# Patient Record
Sex: MALE | Race: BLACK OR AFRICAN AMERICAN | Employment: FULL TIME | URBAN - METROPOLITAN AREA
[De-identification: names, ages, dates, MRNs, and addresses within clinical notes are randomized per-mention and may not be internally consistent; named-entity substitution may affect disease eponyms.]

---

## 2017-09-18 ENCOUNTER — APPOINTMENT (OUTPATIENT)
Dept: GENERAL RADIOLOGY | Age: 32
End: 2017-09-18
Attending: EMERGENCY MEDICINE
Payer: SELF-PAY

## 2017-09-18 ENCOUNTER — HOSPITAL ENCOUNTER (EMERGENCY)
Age: 32
Discharge: HOME | End: 2017-09-18
Attending: EMERGENCY MEDICINE
Payer: SELF-PAY

## 2017-09-18 VITALS
OXYGEN SATURATION: 97 % | SYSTOLIC BLOOD PRESSURE: 148 MMHG | DIASTOLIC BLOOD PRESSURE: 82 MMHG | HEART RATE: 73 BPM | TEMPERATURE: 98.6 F | BODY MASS INDEX: 36.7 KG/M2 | WEIGHT: 301.37 LBS | HEIGHT: 76 IN | RESPIRATION RATE: 18 BRPM

## 2017-09-18 DIAGNOSIS — M25.511 PAIN IN JOINT OF RIGHT SHOULDER: Primary | ICD-10-CM

## 2017-09-18 PROCEDURE — 74011250637 HC RX REV CODE- 250/637: Performed by: PHYSICIAN ASSISTANT

## 2017-09-18 PROCEDURE — 73030 X-RAY EXAM OF SHOULDER: CPT

## 2017-09-18 PROCEDURE — 99283 EMERGENCY DEPT VISIT LOW MDM: CPT

## 2017-09-18 RX ORDER — IBUPROFEN 400 MG/1
800 TABLET ORAL
Status: COMPLETED | OUTPATIENT
Start: 2017-09-18 | End: 2017-09-18

## 2017-09-18 RX ORDER — METHOCARBAMOL 500 MG/1
500 TABLET, FILM COATED ORAL
Qty: 20 TAB | Refills: 0 | OUTPATIENT
Start: 2017-09-18 | End: 2021-07-21

## 2017-09-18 RX ORDER — IBUPROFEN 800 MG/1
800 TABLET ORAL
Qty: 20 TAB | Refills: 0 | Status: SHIPPED | OUTPATIENT
Start: 2017-09-18 | End: 2017-09-25

## 2017-09-18 RX ORDER — ACETAMINOPHEN 325 MG/1
650 TABLET ORAL
Qty: 20 TAB | Refills: 0 | OUTPATIENT
Start: 2017-09-18 | End: 2021-07-21

## 2017-09-18 RX ADMIN — IBUPROFEN 800 MG: 400 TABLET, FILM COATED ORAL at 14:31

## 2017-09-18 NOTE — DISCHARGE INSTRUCTIONS
Shoulder Bursitis: Care Instructions  Your Care Instructions    Bursitis is inflammation of the bursa. A bursa is a small sac of fluid that cushions a joint and helps it move easily. A bursa sits under the highest point of your shoulder. You can get bursitis by overusing your shoulder, which can happen with activities such as lifting, pitching a ball, or painting. Symptoms of bursitis include pain when you move your arm. Your arm may hurt when you try to lift it, and the pain can reach down the side of your arm. You may have trouble sleeping because of the pain. Bursitis usually gets better if you avoid the activity that caused it. If pain lasts or gets worse despite home treatment, your doctor may draw fluid from the bursa through a needle. This may relieve your pain and help your doctor know if you have an infection. If so, your doctor will prescribe antibiotics. If you have inflammation only, you may get a corticosteroid shot to reduce swelling and pain. Sometimes surgery is needed to drain or remove the bursa. Follow-up care is a key part of your treatment and safety. Be sure to make and go to all appointments, and call your doctor if you are having problems. Its also a good idea to know your test results and keep a list of the medicines you take. How can you care for yourself at home? · Put ice or a cold pack on your shoulder for 10 to 20 minutes at a time. Put a thin cloth between the ice and your skin. · After 3 days of using ice, use heat on your shoulder. You can use a hot water bottle, a heating pad set on low, or a warm, moist towel. Some doctors suggest alternating between hot and cold. · Rest your shoulder. Stop any activities that cause pain. Switch to activities that do not stress your shoulder. · Take your medicines exactly as prescribed. Call your doctor if you think you are having a problem with your medicine.   · If your doctor recommends it, take anti-inflammatory medicines to reduce pain. These include ibuprofen (Advil, Motrin) and naproxen (Aleve). Read and follow all instructions on the label. · To prevent stiffness, gently move the shoulder joint through its full range of motion. As the pain gets better, keep doing range-of-motion exercises. Ask your doctor for exercises that will make the muscles around the shoulder joint stronger. Do these as directed. · You can slowly return to the activity that caused the pain, but do it with less effort until you can do it without pain or swelling. Be sure to warm up before and stretch after you do the activity. When should you call for help? Call your doctor now or seek immediate medical care if:  · You have a fever. · You have increased swelling or redness in your shoulder. · You cannot use your shoulder, or the pain in your shoulder gets worse. Watch closely for changes in your health, and be sure to contact your doctor if:  · You have pain for 2 weeks or longer despite home treatment. Where can you learn more? Go to http://monroe-alannah.info/. Enter M955 in the search box to learn more about \"Shoulder Bursitis: Care Instructions. \"  Current as of: March 21, 2017  Content Version: 11.3  © 8157-6840 [employer]. Care instructions adapted under license by [employer] (which disclaims liability or warranty for this information). If you have questions about a medical condition or this instruction, always ask your healthcare professional. [place] [address] any warranty or liability for your use of this information.

## 2017-09-18 NOTE — ED PROVIDER NOTES
Patient is a 27 y.o. male presenting with shoulder pain. The history is provided by the patient. Shoulder Pain    The incident occurred 2 days ago (Pt endorses waking up 3 mornings ago with Rt shoulder pain. NKI. ). There was no injury mechanism. The right shoulder is affected. The pain is at a severity of 8/10. The pain is moderate. The pain has been constant since onset. The pain does not radiate. There is no history of shoulder injury. He has no other injuries. There is no history of shoulder surgery. Pertinent negatives include no numbness, no muscle weakness and no tingling. He reports no foreign bodies present. Past Medical History:   Diagnosis Date    Asthma        History reviewed. No pertinent surgical history. History reviewed. No pertinent family history. Social History     Social History    Marital status: SINGLE     Spouse name: N/A    Number of children: N/A    Years of education: N/A     Occupational History    Not on file. Social History Main Topics    Smoking status: Current Some Day Smoker     Packs/day: 0.25    Smokeless tobacco: Not on file    Alcohol use No    Drug use: No      Comment: last use 1/25/15    Sexual activity: Not on file     Other Topics Concern    Not on file     Social History Narrative         ALLERGIES: Review of patient's allergies indicates no known allergies. Review of Systems   Constitutional: Negative for activity change, appetite change, chills, fatigue and fever. HENT: Negative. Respiratory: Negative. Negative for cough and shortness of breath. Cardiovascular: Negative. Negative for chest pain. Gastrointestinal: Negative. Negative for abdominal pain. Musculoskeletal: Positive for arthralgias. Negative for back pain, gait problem, joint swelling, myalgias, neck pain and neck stiffness. Skin: Negative. Negative for color change, pallor, rash and wound. Neurological: Negative for tingling, numbness and headaches. Psychiatric/Behavioral: Negative. Vitals:    09/18/17 1350   BP: 148/82   Pulse: 73   Resp: 18   Temp: 98.6 °F (37 °C)   SpO2: 97%   Weight: 136.7 kg (301 lb 5.9 oz)   Height: 6' 4\" (1.93 m)            Physical Exam   Constitutional: He is oriented to person, place, and time. He appears well-developed and well-nourished. No distress. HENT:   Head: Normocephalic and atraumatic. Right Ear: Hearing and external ear normal.   Left Ear: Hearing and external ear normal.   Nose: Nose normal.   Eyes: Conjunctivae and EOM are normal. Pupils are equal, round, and reactive to light. Neck: Normal range of motion. Cardiovascular: Normal rate, regular rhythm, normal heart sounds and intact distal pulses. Pulmonary/Chest: Effort normal and breath sounds normal. No accessory muscle usage. No respiratory distress. He has no wheezes. He has no rales. He exhibits no tenderness. Abdominal: Soft. There is no tenderness. Musculoskeletal:        Right shoulder: He exhibits decreased range of motion (d/t pain.), tenderness, bony tenderness and pain. He exhibits no swelling, no effusion, no crepitus, no deformity, no laceration, no spasm and normal pulse. Decreased strength: d/t pain. Left shoulder: Normal.        Right elbow: Normal.       Cervical back: Normal.        Thoracic back: Normal.        Lumbar back: Normal.   Neurological: He is alert and oriented to person, place, and time. Skin: Skin is warm, dry and intact. No abrasion, no bruising, no burn, no ecchymosis and no rash noted. He is not diaphoretic. No erythema. No pallor. Psychiatric: He has a normal mood and affect. His speech is normal and behavior is normal. Judgment and thought content normal.   Nursing note and vitals reviewed.        MDM  Number of Diagnoses or Management Options  Pain in joint of right shoulder:   Diagnosis management comments: DDx: bursitis, arthritis, strain, sprain, fx, dislocation unlikely    LABORATORY TESTS:  No results found for this or any previous visit (from the past 12 hour(s)). IMAGING RESULTS:  XR SHOULDER RT AP/LAT MIN 2 V   Final Result   EXAM:  XR SHOULDER RT AP/LAT MIN 2 V     INDICATION:   Right shoulder pain after fall last night.     COMPARISON: None.     FINDINGS: 2 views of the right shoulder demonstrate no fracture, dislocation or  other acute abnormality.     IMPRESSION  IMPRESSION:  No acute abnormality. MEDICATIONS GIVEN:  Medications  ibuprofen (MOTRIN) tablet 800 mg (not administered)    IMPRESSION:  Pain in joint of right shoulder  (primary encounter diagnosis)    PLAN:  1. Current Discharge Medication List    START taking these medications    ibuprofen (MOTRIN) 800 mg tablet  Take 1 Tab by mouth every six (6) hours as needed for Pain for up to 7 days. Qty: 20 Tab Refills: 0    acetaminophen (TYLENOL) 325 mg tablet  Take 2 Tabs by mouth every four (4) hours as needed for Pain. Qty: 20 Tab Refills: 0    methocarbamol (ROBAXIN) 500 mg tablet  Take 1 Tab by mouth every six (6) hours as needed (mm spasm). Qty: 20 Tab Refills: 0      CONTINUE these medications which have NOT CHANGED    albuterol (PROVENTIL, VENTOLIN) 90 mcg/Actuation inhaler  Take 2 Puffs by inhalation every six (6) hours as needed.         2. Follow-up Information     Follow up With Details Comments Contact Info    [facility] Schedule an appointment as soon as possible for a visit in   1 week As needed, If symptoms worsen [facility]  [address] [address] [address] Schedule an appointment as soon as possible for a   visit in 1 week As needed, If symptoms worsen [address] [facility] [address]  [phone]      Return to ED if worse                  Amount and/or Complexity of Data Reviewed  Tests in the radiology section of CPT®: ordered and reviewed  Tests in the medicine section of CPT®: ordered and reviewed    Patient Progress  Patient progress: stable    ED Course       Procedures    2:30 PM  I have discussed with patient their diagnosis, treatment, and follow up plan. The patient agrees to follow up as outlined in discharge paperwork and also to return to the ED with any worsening.  Mavis Andrews PA-C

## 2021-07-21 ENCOUNTER — HOSPITAL ENCOUNTER (EMERGENCY)
Age: 36
Discharge: HOME | End: 2021-07-21
Attending: EMERGENCY MEDICINE

## 2021-07-21 VITALS
TEMPERATURE: 99.1 F | OXYGEN SATURATION: 97 % | DIASTOLIC BLOOD PRESSURE: 98 MMHG | BODY MASS INDEX: 38.36 KG/M2 | HEIGHT: 76 IN | RESPIRATION RATE: 16 BRPM | SYSTOLIC BLOOD PRESSURE: 144 MMHG | HEART RATE: 90 BPM | WEIGHT: 315 LBS

## 2021-07-21 DIAGNOSIS — L02.01 FACIAL ABSCESS: Primary | ICD-10-CM

## 2021-07-21 DIAGNOSIS — L03.211 CELLULITIS OF FACE: ICD-10-CM

## 2021-07-21 PROCEDURE — 99283 EMERGENCY DEPT VISIT LOW MDM: CPT

## 2021-07-21 PROCEDURE — 74011250637 HC RX REV CODE- 250/637: Performed by: PHYSICIAN ASSISTANT

## 2021-07-21 RX ORDER — IBUPROFEN 400 MG/1
800 TABLET ORAL
Status: COMPLETED | OUTPATIENT
Start: 2021-07-21 | End: 2021-07-21

## 2021-07-21 RX ORDER — SULFAMETHOXAZOLE AND TRIMETHOPRIM 800; 160 MG/1; MG/1
1 TABLET ORAL 2 TIMES DAILY
Qty: 14 TABLET | Refills: 0 | Status: SHIPPED | OUTPATIENT
Start: 2021-07-21 | End: 2021-07-28

## 2021-07-21 RX ORDER — IBUPROFEN 800 MG/1
800 TABLET ORAL
Qty: 20 TABLET | Refills: 0 | Status: SHIPPED | OUTPATIENT
Start: 2021-07-21 | End: 2021-07-28

## 2021-07-21 RX ORDER — SULFAMETHOXAZOLE AND TRIMETHOPRIM 800; 160 MG/1; MG/1
1 TABLET ORAL
Status: COMPLETED | OUTPATIENT
Start: 2021-07-21 | End: 2021-07-21

## 2021-07-21 RX ADMIN — IBUPROFEN 800 MG: 400 TABLET, FILM COATED ORAL at 23:10

## 2021-07-21 RX ADMIN — SULFAMETHOXAZOLE AND TRIMETHOPRIM 1 TABLET: 800; 160 TABLET ORAL at 23:10

## 2021-07-22 NOTE — ED PROVIDER NOTES
EMERGENCY DEPARTMENT HISTORY AND PHYSICAL EXAM    Date: 7/21/2021  Patient Name: Grace Thomason    History of Presenting Illness     Chief Complaint   Patient presents with    Facial Swelling         History Provided By: Patient    HPI: Grace Thomason is a 43 y.o. male with a PMH of asthma who presents with L facial swelling x 2 days. Pt states he noticed some swelling to the L cheek/beard area yesterday but when he got out of shower today he tried to squeeze the area. Pt states some pus came out and then his face started swelling more and is now painful. Pt states he tried tylenol and benadryl with no relief. Pt rates pain 10/10. Pain is exacerbated with touch and there are no alleviating factors reported. PCP: None    Current Outpatient Medications   Medication Sig Dispense Refill    trimethoprim-sulfamethoxazole (Bactrim DS) 160-800 mg per tablet Take 1 Tablet by mouth two (2) times a day for 7 days. 14 Tablet 0    ibuprofen (MOTRIN) 800 mg tablet Take 1 Tablet by mouth every eight (8) hours as needed for Pain for up to 7 days. 20 Tablet 0    albuterol (PROVENTIL, VENTOLIN) 90 mcg/Actuation inhaler Take 2 Puffs by inhalation every six (6) hours as needed. Past History     Past Medical History:  Past Medical History:   Diagnosis Date    Asthma        Past Surgical History:  No past surgical history on file. Family History:  No family history on file. Social History:  Social History     Tobacco Use    Smoking status: Current Some Day Smoker     Packs/day: 0.25   Substance Use Topics    Alcohol use: No    Drug use: No     Types: Marijuana     Comment: last use 1/25/15       Allergies:  No Known Allergies      Review of Systems   Review of Systems   Constitutional: Negative for chills and fever. HENT: Positive for facial swelling. Skin: Positive for color change and wound. Allergic/Immunologic: Negative for immunocompromised state.    Neurological: Negative for speech difficulty and weakness. All other systems reviewed and are negative. Physical Exam     Vitals:    07/21/21 2153   BP: (!) 144/98   Pulse: 90   Resp: 16   Temp: 99.1 °F (37.3 °C)   SpO2: 97%   Weight: 149.7 kg (330 lb)   Height: 6' 4\" (1.93 m)     Physical Exam  Vitals and nursing note reviewed. Constitutional:       General: He is not in acute distress. Appearance: He is well-developed. HENT:      Head: Normocephalic and atraumatic. Mouth/Throat:      Pharynx: No oropharyngeal exudate. Eyes:      Conjunctiva/sclera: Conjunctivae normal.   Cardiovascular:      Rate and Rhythm: Normal rate and regular rhythm. Heart sounds: Normal heart sounds. Pulmonary:      Effort: Pulmonary effort is normal. No respiratory distress. Breath sounds: Normal breath sounds. No wheezing or rales. Musculoskeletal:         General: Normal range of motion. Skin:     General: Skin is warm and dry. Neurological:      Mental Status: He is alert and oriented to person, place, and time. Diagnostic Study Results     Labs -   No results found for this or any previous visit (from the past 12 hour(s)). Radiologic Studies -   No orders to display     CT Results  (Last 48 hours)    None        CXR Results  (Last 48 hours)    None            Medical Decision Making   I am the first provider for this patient. I reviewed the vital signs, available nursing notes, past medical history, past surgical history, family history and social history. Vital Signs-Reviewed the patient's vital signs. Records Reviewed: Nursing Notes and Old Medical Records    Provider Notes (Medical Decision Making):   Patient presents with indurated non fluctuant warm painful lesion concerning for abscess. No signs of sepsis at this time. Do not feel I&D would be beneficial at this time so will start pt on abx and NSAIDs.   Advised to do warm compresses at least TID and return in 48hrs if no improvement or worsening symptoms. Disposition:  Discharged    DISCHARGE NOTE:   11:18 PM      Care plan outlined and precautions discussed. Patient has no new complaints, changes, or physical findings. All medications were reviewed with the patient; will d/c home. All of pt's questions and concerns were addressed. Patient was instructed and agrees to follow up with PCP prn, as well as to return to the ED upon further deterioration. Patient is ready to go home. Follow-up Information     Follow up With Specialties Details Why Contact Info    [facility]  In 1 week As needed [address] [place] [address] [address] [facility] [address]    [address] [facility] EMERGENCY DEPT Emergency Medicine In 2 days If symptoms persist [address]          Discharge Medication List as of 7/21/2021 11:17 PM      START taking these medications    Details   trimethoprim-sulfamethoxazole (Bactrim DS) 160-800 mg per tablet Take 1 Tablet by mouth two (2) times a day for 7 days. , Normal, Disp-14 Tablet, R-0      ibuprofen (MOTRIN) 800 mg tablet Take 1 Tablet by mouth every eight (8) hours as needed for Pain for up to 7 days. , Normal, Disp-20 Tablet, R-0         CONTINUE these medications which have NOT CHANGED    Details   albuterol (PROVENTIL, VENTOLIN) 90 mcg/Actuation inhaler Take 2 Puffs by inhalation every six (6) hours as needed. Historical Med, 2 Puff             Procedures:  Procedures    Please note that this dictation was completed with Dragon, computer voice recognition software. Quite often unanticipated grammatical, syntax, homophones, and other interpretive errors are inadvertently transcribed by the computer software. Please disregard these errors. Additionally, please excuse any errors that have escaped final proofreading. Diagnosis     Clinical Impression:   1. Facial abscess    2.  Cellulitis of face

## 2021-07-22 NOTE — ED TRIAGE NOTES
Patient presents to the ED with c/o a bump on the left side of his face that he squeezed once he got out of the shower. Stated pus came out of it and then his face began to swell. Pt reports severe pain in his face.

## 2021-07-22 NOTE — ED NOTES
Patient is alert and oriented x 4 and in no acute distress at this time. Respirations are at a regular rate, depth, and pattern. Patient updated on plan of care and has no questions or concerns at this time. Call bell within reach. Will continue to monitor. Please reference nursing assessment. Emergency Department Nursing Plan of Care       The Nursing Plan of Care is developed from the Nursing assessment and Emergency Department Attending provider initial evaluation. The plan of care may be reviewed in the ED Provider note.     The Plan of Care was developed with the following considerations:   Patient / Family readiness to learn indicated by:verbalized understanding and successful return demonstration  Persons(s) to be included in education: patient  Barriers to Learning/Limitations:No    Signed     Lissa Chow RN    7/21/2021   10:56 PM

## 2021-08-11 ENCOUNTER — HOSPITAL ENCOUNTER (EMERGENCY)
Age: 36
Discharge: HOME | End: 2021-08-11
Attending: EMERGENCY MEDICINE

## 2021-08-11 VITALS
BODY MASS INDEX: 38.36 KG/M2 | HEART RATE: 85 BPM | TEMPERATURE: 99 F | WEIGHT: 315 LBS | RESPIRATION RATE: 18 BRPM | SYSTOLIC BLOOD PRESSURE: 148 MMHG | OXYGEN SATURATION: 96 % | DIASTOLIC BLOOD PRESSURE: 89 MMHG | HEIGHT: 76 IN

## 2021-08-11 DIAGNOSIS — L73.8 FOLLICULITIS BARBAE: Primary | ICD-10-CM

## 2021-08-11 PROCEDURE — 99282 EMERGENCY DEPT VISIT SF MDM: CPT

## 2021-08-11 PROCEDURE — 75810000218 HC FINE NEEDLE ASPIRATION

## 2021-08-11 RX ORDER — CEPHALEXIN 500 MG/1
500 CAPSULE ORAL 4 TIMES DAILY
Qty: 40 CAPSULE | Refills: 0 | Status: SHIPPED | OUTPATIENT
Start: 2021-08-11 | End: 2021-08-21

## 2021-08-11 RX ORDER — MUPIROCIN 20 MG/G
OINTMENT TOPICAL DAILY
Qty: 22 G | Refills: 0 | Status: SHIPPED | OUTPATIENT
Start: 2021-08-11 | End: 2022-06-08

## 2021-08-11 NOTE — DISCHARGE INSTRUCTIONS
It was a pleasure taking care of you at [facility] Emergency Department today. We know that when you come to [facility], you are entrusting us with your health, comfort, and safety. Our physicians and nurses honor that trust, and we truly appreciate the opportunity to care for you and your loved ones. We also value our feedback. If you receive a survey about your Emergency Department experience today, please fill it out. We care about our patients' feedback, and we listen to what you have to say. Thank you!

## 2021-08-11 NOTE — ED TRIAGE NOTES
Patient presents to the ED with c/o skin problem to left side of face. Pt reports receiving an antibiotic and then the swelling came back.

## 2021-08-11 NOTE — ED NOTES
Pt given printed discharge instructions and 2 script(s). Pt verbalized understanding of instructions and script(s). Pt alert and oriented, in no acute distress, ambulatory with self.

## 2021-08-11 NOTE — ED PROVIDER NOTES
EMERGENCY DEPARTMENT HISTORY AND PHYSICAL EXAM      Date: 8/11/2021  Patient Name: Phyllis Gratn    History of Presenting Illness     Chief Complaint   Patient presents with    Skin Problem     History Provided By: Patient    HPI: Phyllis Grant, 43 y.o. male with medical history significant for asthma and tobacco abuse who presents via self to the ED with cc of acute mild aching left cheek rash X 1 month. Endorses he was treated for cellulitis and abscess of left cheek last month; completed Bactrim antibiotics as prescribed. He endorses that the swelling went down, but has now returned within the last day or 2. No medications or modifying factors. No fever, chills, nausea, vomiting, headache, drainage, neck pain or stiffness. PCP: None    There are no other complaints, changes, or physical findings at this time. No current facility-administered medications on file prior to encounter. Current Outpatient Medications on File Prior to Encounter   Medication Sig Dispense Refill    albuterol (PROVENTIL, VENTOLIN) 90 mcg/Actuation inhaler Take 2 Puffs by inhalation every six (6) hours as needed. Past History     Past Medical History:  Past Medical History:   Diagnosis Date    Asthma      Past Surgical History:  No past surgical history on file. Family History:  No family history on file. Social History:  Social History     Tobacco Use    Smoking status: Current Some Day Smoker     Packs/day: 0.25   Substance Use Topics    Alcohol use: No    Drug use: No     Types: Marijuana     Comment: last use 1/25/15     Allergies:  No Known Allergies  Review of Systems   Review of Systems   Constitutional: Negative. Negative for activity change, appetite change, chills, diaphoresis, fatigue and fever. HENT: Negative. Eyes: Negative. Negative for pain and visual disturbance. Respiratory: Negative. Negative for apnea, cough, chest tightness and shortness of breath. Cardiovascular: Negative. Negative for chest pain, palpitations and leg swelling. Gastrointestinal: Negative. Negative for abdominal pain, diarrhea, nausea and vomiting. Genitourinary: Negative. Musculoskeletal: Negative. Negative for arthralgias, neck pain and neck stiffness. Skin: Positive for rash. Negative for color change, pallor and wound. Neurological: Negative for dizziness, light-headedness and headaches. Psychiatric/Behavioral: Negative. Physical Exam   Physical Exam  Vitals and nursing note reviewed. Constitutional:       General: He is not in acute distress. Appearance: Normal appearance. He is well-developed. He is not ill-appearing, toxic-appearing or diaphoretic. HENT:      Head: Normocephalic and atraumatic. Right Ear: Hearing and external ear normal.      Left Ear: Hearing and external ear normal.      Nose: Nose normal.   Eyes:      Conjunctiva/sclera: Conjunctivae normal.      Pupils: Pupils are equal, round, and reactive to light. Pulmonary:      Effort: Pulmonary effort is normal. No accessory muscle usage or respiratory distress. Musculoskeletal:         General: Normal range of motion. Cervical back: Normal range of motion. Skin:     General: Skin is warm and dry. Coloration: Skin is not pale. Findings: Rash present. No abscess. Rash is pustular. Neurological:      Mental Status: He is alert and oriented to person, place, and time. Psychiatric:         Speech: Speech normal.         Behavior: Behavior normal.         Thought Content: Thought content normal.         Judgment: Judgment normal.       Diagnostic Study Results   Labs -   No results found for this or any previous visit (from the past 12 hour(s)). Radiologic Studies -   No orders to display     No results found. Medical Decision Making   I am the first provider for this patient.     I reviewed the vital signs, available nursing notes, past medical history, past surgical history, family history and social history. Vital Signs-Reviewed the patient's vital signs. Patient Vitals for the past 24 hrs:   Temp Pulse Resp BP SpO2   08/11/21 1722 99 °F (37.2 °C) 85 18 (!) 148/89 96 %     Pulse Oximetry Analysis - 96% on RA (normal)    Records Reviewed: Nursing Notes, Old Medical Records, Previous Radiology Studies and Previous Laboratory Studies    Provider Notes (Medical Decision Making):   26-year-old male presents with recurrent small pustule to left cheek X 1 month. Differential includes folliculitis, abscess, cellulitis, cyst.  Patient is requesting I&D at this time. There is no fluctuant lesion concerning for abscess, but there is a small pustule which may warrant needle aspiration. Will also treat patient with antibiotics. ED Course:   Initial assessment performed. The patients presenting problems have been discussed, and they are in agreement with the care plan formulated and outlined with them. I have encouraged them to ask questions as they arise throughout their visit. Procedure Note - Needle Aspiration:   5:35 PM  Performed by: GINNY Armando  Verbal Consent obtained and witnessed by ANIKET Conner  Complexity: simple    Skin prepped with alcohol prep wipe. Sterile field established. Pustule to left cheek was aspirated with # 18 gauge needle, and 0.25mLs of purulent and bloody drainage was expressed. Sterile dressing applied. Estimated blood loss: minimal  The procedure took 1-15 minutes, and pt tolerated well. Progress Note:   Updated pt on all returned results and findings. Discussed the importance of proper follow up as referred below along with return precautions. Pt in agreement with the care plan and expresses agreement with and understanding of all items discussed. TOBACCO COUNSELING:  Upon evaluation, pt expressed that they are a current tobacco user.  Pt has been counseled on the dangers of smoking and was encouraged to quit as soon as possible in order to decrease further risks to their health. Pt has conveyed their understanding of the risks involved should they continue to use tobacco products. 4-minute discussion. Disposition:  5:35 PM  I have discussed with patient their diagnosis, treatment, and follow up plan. The patient agrees to follow up as outlined in discharge paperwork and also to return to the ED with any worsening. April Alvarenga PA-C      PLAN:  1. Current Discharge Medication List      START taking these medications    Details   mupirocin (BACTROBAN) 2 % ointment Apply  to affected area daily. Qty: 22 g, Refills: 0  Start date: 8/11/2021      cephALEXin (Keflex) 500 mg capsule Take 1 Capsule by mouth four (4) times daily for 10 days. Qty: 40 Capsule, Refills: 0  Start date: 8/11/2021, End date: 8/21/2021           2. Follow-up Information     Follow up With Specialties Details Why Contact Info    Laverne Birch MD General Surgery, Breast Surgery, Oncology Schedule an appointment as soon as possible for a visit  As needed [address] [place], [place] [address] [address] [facility] [address] [phone]      [address] [place] [address] EMERGENCY DEPT Emergency Medicine Go to  As needed, If symptoms worsen [address] [facility]  [phone]        Return to ED if worse     Diagnosis     Clinical Impression:   1. Folliculitis barbae            Please note that this dictation was completed with Dragon, computer voice recognition software. Quite often unanticipated grammatical, syntax, homophones, and other interpretive errors are inadvertently transcribed by the computer software. Please disregard these errors. Additionally, please excuse any errors that have escaped final proofreading.

## 2022-06-08 ENCOUNTER — HOSPITAL ENCOUNTER (EMERGENCY)
Age: 37
Discharge: HOME | End: 2022-06-08
Attending: STUDENT IN AN ORGANIZED HEALTH CARE EDUCATION/TRAINING PROGRAM

## 2022-06-08 VITALS
BODY MASS INDEX: 38.36 KG/M2 | HEIGHT: 76 IN | WEIGHT: 315 LBS | HEART RATE: 65 BPM | OXYGEN SATURATION: 96 % | RESPIRATION RATE: 18 BRPM | DIASTOLIC BLOOD PRESSURE: 105 MMHG | TEMPERATURE: 98 F | SYSTOLIC BLOOD PRESSURE: 143 MMHG

## 2022-06-08 DIAGNOSIS — H10.32 ACUTE BACTERIAL CONJUNCTIVITIS OF LEFT EYE: Primary | ICD-10-CM

## 2022-06-08 PROCEDURE — 99283 EMERGENCY DEPT VISIT LOW MDM: CPT

## 2022-06-08 RX ORDER — POLYMYXIN B SULFATE AND TRIMETHOPRIM 1; 10000 MG/ML; [USP'U]/ML
1 SOLUTION OPHTHALMIC EVERY 4 HOURS
Qty: 10 ML | Refills: 0 | Status: SHIPPED | OUTPATIENT
Start: 2022-06-08 | End: 2022-06-18

## 2022-06-08 RX ORDER — IBUPROFEN 600 MG/1
600 TABLET ORAL
Qty: 20 TABLET | Refills: 0 | Status: SHIPPED | OUTPATIENT
Start: 2022-06-08

## 2022-06-08 RX ORDER — KETOROLAC TROMETHAMINE 5 MG/ML
1 SOLUTION OPHTHALMIC
Qty: 1 EACH | Refills: 0 | Status: SHIPPED | OUTPATIENT
Start: 2022-06-08 | End: 2022-06-18

## 2022-06-08 NOTE — ED PROVIDER NOTES
EMERGENCY DEPARTMENT HISTORY AND PHYSICAL EXAM      Date: 6/8/2022  Patient Name: Roshni Chambers    History of Presenting Illness     Chief Complaint   Patient presents with    Eye Swelling       History Provided By: Patient    HPI: Roshni Chambers, 42 y.o. male with past medical history of asthma presents to the ED with cc of left eye swelling, erythema, tearing, pain and mucus drainage. Patient reports symptoms are present when he first woke up this morning. He denies any preceding trauma to the eye. No foreign body sensation. He denies any eye itching. Denies any known sick contacts with similar symptoms. However, patient reports that he has been at the gym, and possibly could have touched something and later rubbed his eye-causing his current symptoms. He denies any changes to his vision. He is not a contact lens wearer. No recent illness--specifically no rhinorrhea, congestion, URI symptoms. PCP: None    No current facility-administered medications on file prior to encounter. Current Outpatient Medications on File Prior to Encounter   Medication Sig Dispense Refill    [DISCONTINUED] mupirocin (BACTROBAN) 2 % ointment Apply  to affected area daily. 22 g 0    albuterol (PROVENTIL, VENTOLIN) 90 mcg/Actuation inhaler Take 2 Puffs by inhalation every six (6) hours as needed. Past History     Past Medical History:  Past Medical History:   Diagnosis Date    Asthma        Past Surgical History:  No past surgical history on file. Family History:  History reviewed. No pertinent family history. Social History:  Social History     Tobacco Use    Smoking status: Current Some Day Smoker     Packs/day: 0.25    Smokeless tobacco: Not on file   Substance Use Topics    Alcohol use: No    Drug use: No     Types: Marijuana     Comment: last use 1/25/15       Allergies:  No Known Allergies      Review of Systems   Review of Systems   Constitutional: Negative for chills and fever.    HENT: Negative for congestion and rhinorrhea. Eyes: Positive for photophobia, pain, discharge and redness. Negative for itching and visual disturbance. Respiratory: Negative for chest tightness and shortness of breath. Cardiovascular: Negative for chest pain and palpitations. Gastrointestinal: Negative for abdominal pain, diarrhea, nausea and vomiting. Genitourinary: Negative for dysuria, flank pain and hematuria. Musculoskeletal: Negative for back pain and neck pain. Skin: Negative for rash. Allergic/Immunologic: Negative for immunocompromised state. Neurological: Negative for dizziness, speech difficulty, weakness and headaches. Hematological: Negative for adenopathy. Psychiatric/Behavioral: Negative for dysphoric mood and suicidal ideas. Physical Exam   Physical Exam  Vitals and nursing note reviewed. Constitutional:       General: He is not in acute distress. Appearance: Normal appearance. He is not ill-appearing or toxic-appearing. HENT:      Head: Normocephalic and atraumatic. Nose: Nose normal.      Mouth/Throat:      Mouth: Mucous membranes are moist.   Eyes:      General:         Right eye: No foreign body, discharge or hordeolum. Left eye: Discharge present. No foreign body or hordeolum. Extraocular Movements: Extraocular movements intact. Right eye: Normal extraocular motion. Left eye: Normal extraocular motion. Conjunctiva/sclera:      Right eye: Right conjunctiva is injected. No chemosis, exudate or hemorrhage. Left eye: Left conjunctiva is injected. No chemosis, exudate or hemorrhage. Pupils: Pupils are equal, round, and reactive to light. Comments: Mild periorbital edema of the left eye. No associated warmth, significant erythema. No foreign body visualized in the left eye. Diffuse conjunctival injection of the left eye.   Mucopurulent discharge noted at the corner of left eye    Cardiovascular:      Rate and Rhythm: Normal rate and regular rhythm. Pulses: Normal pulses. Pulmonary:      Effort: Pulmonary effort is normal. No respiratory distress. Breath sounds: Normal breath sounds. No stridor. No wheezing or rhonchi. Abdominal:      General: Abdomen is flat. There is no distension. Palpations: There is no mass. Tenderness: There is no abdominal tenderness. Musculoskeletal:         General: Normal range of motion. Cervical back: Normal range of motion and neck supple. Skin:     General: Skin is warm and dry. Neurological:      General: No focal deficit present. Mental Status: He is alert. Mental status is at baseline. Sensory: No sensory deficit. Motor: No weakness. Diagnostic Study Results     Labs -   No results found for this or any previous visit (from the past 24 hour(s)). Radiologic Studies -   No orders to display     CT Results  (Last 48 hours)    None        CXR Results  (Last 48 hours)    None          Medical Decision Making   IGaudencio MD am the first provider for this patient, and I am the attending of record for this patient encounter. I reviewed the vital signs, available nursing notes, past medical history, past surgical history, family history and social history. Vital Signs-Reviewed the patient's vital signs. Patient Vitals for the past 24 hrs:   Temp Pulse Resp BP SpO2   06/08/22 0626 98 °F (36.7 °C) 65 18 (!) 143/105 96 %     Records Reviewed: Nursing Notes and Old Medical Records    Provider Notes (Medical Decision Making):   DDx: Bacterial conjunctivitis, allergic conjunctivitis, viral conjunctivitis, foreign body, eye trauma, preseptal cellulitis, corneal abrasion, etc.    Based on history and exam, patient's symptoms are most likely secondary to bacterial conjunctivitis. Will treat with Polytrim ophthalmic solution, Acular ophthalmic drops for pain, along with p.o. Motrin. Follow-up with [facility].   Patient felt comfortable with plan as outlined above. ED Course:   Initial assessment performed. The patient's presenting problems have been discussed, and they are in agreement with the care plan formulated and outlined with them. I have encouraged them to ask questions as they arise throughout their visit. Brianna Chaidez MD      Disposition:  DC      DISCHARGE PLAN:  1. Discharge Medication List as of 6/8/2022  7:36 AM      START taking these medications    Details   trimethoprim-polymyxin b (POLYTRIM) ophthalmic solution Administer 1 Drop to both eyes every four (4) hours for 10 days. , Normal, Disp-10 mL, R-0      ibuprofen (MOTRIN) 600 mg tablet Take 1 Tablet by mouth every six (6) hours as needed for Pain., Normal, Disp-20 Tablet, R-0      ketorolac (Acular) 0.5 % ophthalmic solution Apply 1 Drop to eye four (4) times daily as needed for Pain for up to 10 days. , Normal, Disp-1 Each, R-0         CONTINUE these medications which have NOT CHANGED    Details   albuterol (PROVENTIL, VENTOLIN) 90 mcg/Actuation inhaler Take 2 Puffs by inhalation every six (6) hours as needed. Historical Med, 2 Puff           2. Follow-up Information     Follow up With Specialties Details Why [address] [facility]  Schedule an appointment as soon as possible for a visit in 3 days  [address] [facility] [address] [phone]        3. Return to ED if worse     Diagnosis     Clinical Impression:   1. Acute bacterial conjunctivitis of left eye        Attestations:    Brianna Chaidez MD    Please note that this dictation was completed with daysoft, the computer voice recognition software. Quite often unanticipated grammatical, syntax, homophones, and other interpretive errors are inadvertently transcribed by the computer software. Please disregard these errors. Please excuse any errors that have escaped final proofreading. Thank you.

## 2022-06-08 NOTE — ED NOTES
Pt presents ambulatory to ED complaining of swelling, tearing, drainage, and L eye pain after waking up this AM. Pt is alert and oriented x 4, RR even and unlabored, skin is warm and dry. Assesment completed and pt updated on plan of care. Emergency Department Nursing Plan of Care       The Nursing Plan of Care is developed from the Nursing assessment and Emergency Department Attending provider initial evaluation. The plan of care may be reviewed in the ED Provider note.     The Plan of Care was developed with the following considerations:   Patient / Family readiness to learn indicated by:verbalized understanding  Persons(s) to be included in education: patient  Barriers to Learning/Limitations:No    Signed     Lauryn Saldana RN    6/8/2022   6:30 AM

## 2022-06-08 NOTE — ED NOTES
Discharge instructions were given to the patient by Lenn Dancer, RN. The patient left the Emergency Department ambulatory, alert and oriented and in no acute distress with 3 prescriptions. The patient was encouraged to call or return to the ED for worsening issues or problems and was encouraged to schedule a follow up appointment for continuing care. The patient verbalized understanding of discharge instructions and prescriptions, all questions were answered. The patient has no further concerns at this time.

## 2023-11-02 ENCOUNTER — HOSPITAL ENCOUNTER (EMERGENCY)
Facility: HOSPITAL | Age: 38
Discharge: HOME | End: 2023-11-02
Attending: EMERGENCY MEDICINE

## 2023-11-02 VITALS
WEIGHT: 315 LBS | SYSTOLIC BLOOD PRESSURE: 159 MMHG | HEART RATE: 64 BPM | DIASTOLIC BLOOD PRESSURE: 108 MMHG | BODY MASS INDEX: 38.36 KG/M2 | TEMPERATURE: 98.3 F | OXYGEN SATURATION: 100 % | RESPIRATION RATE: 18 BRPM | HEIGHT: 76 IN

## 2023-11-02 DIAGNOSIS — H66.011 NON-RECURRENT ACUTE SUPPURATIVE OTITIS MEDIA OF RIGHT EAR WITH SPONTANEOUS RUPTURE OF TYMPANIC MEMBRANE: Primary | ICD-10-CM

## 2023-11-02 PROCEDURE — 6370000000 HC RX 637 (ALT 250 FOR IP): Performed by: EMERGENCY MEDICINE

## 2023-11-02 PROCEDURE — 99283 EMERGENCY DEPT VISIT LOW MDM: CPT

## 2023-11-02 RX ORDER — ACETAMINOPHEN 500 MG
1000 TABLET ORAL
Status: COMPLETED | OUTPATIENT
Start: 2023-11-02 | End: 2023-11-02

## 2023-11-02 RX ORDER — IBUPROFEN 600 MG/1
600 TABLET ORAL
Status: COMPLETED | OUTPATIENT
Start: 2023-11-02 | End: 2023-11-02

## 2023-11-02 RX ORDER — AMOXICILLIN 500 MG/1
500 CAPSULE ORAL 3 TIMES DAILY
Qty: 21 CAPSULE | Refills: 0 | Status: SHIPPED | OUTPATIENT
Start: 2023-11-02 | End: 2023-11-09

## 2023-11-02 RX ORDER — IBUPROFEN 600 MG/1
600 TABLET ORAL 4 TIMES DAILY PRN
Qty: 40 TABLET | Refills: 0 | Status: SHIPPED | OUTPATIENT
Start: 2023-11-02

## 2023-11-02 RX ADMIN — IBUPROFEN 600 MG: 600 TABLET, FILM COATED ORAL at 11:26

## 2023-11-02 RX ADMIN — ACETAMINOPHEN 1000 MG: 500 TABLET ORAL at 11:25

## 2023-11-02 ASSESSMENT — PAIN DESCRIPTION - PAIN TYPE: TYPE: ACUTE PAIN

## 2023-11-02 ASSESSMENT — LIFESTYLE VARIABLES
HOW MANY STANDARD DRINKS CONTAINING ALCOHOL DO YOU HAVE ON A TYPICAL DAY: 1 OR 2
HOW OFTEN DO YOU HAVE A DRINK CONTAINING ALCOHOL: MONTHLY OR LESS

## 2023-11-02 ASSESSMENT — PAIN DESCRIPTION - DESCRIPTORS
DESCRIPTORS: ACHING
DESCRIPTORS: SHARP

## 2023-11-02 ASSESSMENT — PAIN DESCRIPTION - ORIENTATION
ORIENTATION: RIGHT
ORIENTATION: LEFT

## 2023-11-02 ASSESSMENT — PAIN SCALES - GENERAL: PAINLEVEL_OUTOF10: 10

## 2023-11-02 ASSESSMENT — PAIN DESCRIPTION - LOCATION
LOCATION: EAR
LOCATION: EAR

## 2023-11-02 ASSESSMENT — PAIN - FUNCTIONAL ASSESSMENT: PAIN_FUNCTIONAL_ASSESSMENT: 0-10

## 2023-11-02 NOTE — ED NOTES
Pt given discharge papers. Two E prescriptions for Amoxicillin and Ibuprofen sent to patients pharmacy. Pt educated on discharge papers and prescriptions. Pt instructed to follow up with PCP for BP management. Pt verbalizes understanding and denies any further questions. Pt ambulated to waiting room with steady gait, alert and oriented x4.           Gael Saez RN  11/02/23 3900

## 2023-11-02 NOTE — ED PROVIDER NOTES
[facility] EMERGENCY DEPT  EMERGENCY DEPARTMENT ENCOUNTER       Pt Name: Naya Woods  MRN: [id number]  [address] [facility] [place] 1985  Date of evaluation: 11/2/2023  Provider: Skinny Lowe MD   PCP: None, None  Note Started: 11:06 AM 11/2/23     CHIEF COMPLAINT       Chief Complaint   Patient presents with    Ear Problem     Per pt reports right ear pain that started on Friday after being on a plane, denies recent injury. Took Tylenol without pain relief. HISTORY OF PRESENT ILLNESS: 1 or more elements      History From: Patient, History limited by: No limitations     Naya Woods is a 34 y.o. male who presents with chief complaint of right ear pain. Pain has been present over the past 5 to 6 days. It is described as moderate and located throughout the right urine into the jaw and face. Pain is constant. No hearing loss but he does endorse occasional tinnitus. Denies any instrumentation. Symptoms started after he was on a flight, started after he landed and was in the airport. Denies any bleeding or drainage. Denies any injury or trauma. Nursing Notes were all reviewed and agreed with or any disagreements were addressed in the HPI. REVIEW OF SYSTEMS        Positives and Pertinent negatives as per HPI. PAST HISTORY     Past Medical History:  Past Medical History:   Diagnosis Date    Asthma        Past Surgical History:  No past surgical history on file. Family History:  No family history on file.     Social History:  Social History     Tobacco Use    Smoking status: Some Days     Packs/day: .25     Types: Cigarettes   Substance Use Topics    Alcohol use: No    Drug use: No     Types: Marijuana (Weed)       Allergies:  No Known Allergies    CURRENT MEDICATIONS      Previous Medications    ALBUTEROL SULFATE HFA (PROVENTIL;VENTOLIN;PROAIR) 108 (90 BASE) MCG/ACT INHALER    Inhale 2 puffs into the lungs every 6 hours as needed       SCREENINGS               No data recorded         PHYSICAL EXAM      ED Triage

## 2025-01-22 ENCOUNTER — OFFICE VISIT (OUTPATIENT)
Age: 40
End: 2025-01-22
Payer: MEDICAID

## 2025-01-22 VITALS
HEART RATE: 72 BPM | WEIGHT: 315 LBS | HEIGHT: 76 IN | OXYGEN SATURATION: 97 % | SYSTOLIC BLOOD PRESSURE: 136 MMHG | RESPIRATION RATE: 16 BRPM | BODY MASS INDEX: 38.36 KG/M2 | DIASTOLIC BLOOD PRESSURE: 87 MMHG | TEMPERATURE: 98.2 F

## 2025-01-22 DIAGNOSIS — F17.210 TOBACCO DEPENDENCE DUE TO CIGARETTES: ICD-10-CM

## 2025-01-22 DIAGNOSIS — L72.0 EPIDERMOID CYST: ICD-10-CM

## 2025-01-22 DIAGNOSIS — R06.81 WITNESSED EPISODE OF APNEA: ICD-10-CM

## 2025-01-22 DIAGNOSIS — E66.812 CLASS 2 OBESITY DUE TO EXCESS CALORIES WITHOUT SERIOUS COMORBIDITY WITH BODY MASS INDEX (BMI) OF 39.0 TO 39.9 IN ADULT: ICD-10-CM

## 2025-01-22 DIAGNOSIS — Z11.59 NEED FOR HEPATITIS C SCREENING TEST: ICD-10-CM

## 2025-01-22 DIAGNOSIS — Z23 ENCOUNTER FOR IMMUNIZATION: ICD-10-CM

## 2025-01-22 DIAGNOSIS — J45.20 MILD INTERMITTENT ASTHMA WITHOUT COMPLICATION: Primary | ICD-10-CM

## 2025-01-22 DIAGNOSIS — M21.619 BUNION: ICD-10-CM

## 2025-01-22 DIAGNOSIS — Z11.4 SCREENING FOR HIV WITHOUT PRESENCE OF RISK FACTORS: ICD-10-CM

## 2025-01-22 DIAGNOSIS — L73.9 FOLLICULITIS: ICD-10-CM

## 2025-01-22 DIAGNOSIS — Z00.00 ANNUAL PHYSICAL EXAM: ICD-10-CM

## 2025-01-22 DIAGNOSIS — E66.09 CLASS 2 OBESITY DUE TO EXCESS CALORIES WITHOUT SERIOUS COMORBIDITY WITH BODY MASS INDEX (BMI) OF 39.0 TO 39.9 IN ADULT: ICD-10-CM

## 2025-01-22 PROCEDURE — 99204 OFFICE O/P NEW MOD 45 MIN: CPT | Performed by: STUDENT IN AN ORGANIZED HEALTH CARE EDUCATION/TRAINING PROGRAM

## 2025-01-22 PROCEDURE — 90661 CCIIV3 VAC ABX FR 0.5 ML IM: CPT | Performed by: STUDENT IN AN ORGANIZED HEALTH CARE EDUCATION/TRAINING PROGRAM

## 2025-01-22 PROCEDURE — 99386 PREV VISIT NEW AGE 40-64: CPT | Performed by: STUDENT IN AN ORGANIZED HEALTH CARE EDUCATION/TRAINING PROGRAM

## 2025-01-22 SDOH — ECONOMIC STABILITY: FOOD INSECURITY: WITHIN THE PAST 12 MONTHS, THE FOOD YOU BOUGHT JUST DIDN'T LAST AND YOU DIDN'T HAVE MONEY TO GET MORE.: NEVER TRUE

## 2025-01-22 SDOH — ECONOMIC STABILITY: FOOD INSECURITY: WITHIN THE PAST 12 MONTHS, YOU WORRIED THAT YOUR FOOD WOULD RUN OUT BEFORE YOU GOT MONEY TO BUY MORE.: NEVER TRUE

## 2025-01-22 ASSESSMENT — PATIENT HEALTH QUESTIONNAIRE - PHQ9
1. LITTLE INTEREST OR PLEASURE IN DOING THINGS: NOT AT ALL
SUM OF ALL RESPONSES TO PHQ QUESTIONS 1-9: 0
2. FEELING DOWN, DEPRESSED OR HOPELESS: NOT AT ALL
SUM OF ALL RESPONSES TO PHQ QUESTIONS 1-9: 0
SUM OF ALL RESPONSES TO PHQ QUESTIONS 1-9: 0
SUM OF ALL RESPONSES TO PHQ9 QUESTIONS 1 & 2: 0
SUM OF ALL RESPONSES TO PHQ QUESTIONS 1-9: 0

## 2025-01-22 NOTE — PROGRESS NOTES
Chief Complaint   Patient presents with    Establish Care     No previous PCP.  Wants do have blood work done, wnts to check HbA1C  Wants a referral to a dermatologist       \"Have you been to the ER, urgent care clinic since your last visit?  Hospitalized since your last visit?\"    NO    “Have you seen or consulted any other health care providers outside of [facility] since your last visit?”    NO            Click Here for Release of Records Request             1/22/2025     1:18 PM   PHQ-9    Little interest or pleasure in doing things 0   Feeling down, depressed, or hopeless 0   PHQ-2 Score 0   PHQ-9 Total Score 0           Financial Resource Strain: Not on file      Food Insecurity: No Food Insecurity (1/22/2025)    Hunger Vital Sign     Worried About Running Out of Food in the Last Year: Never true     Ran Out of Food in the Last Year: Never true          Health Maintenance Due   Topic Date Due    Pneumococcal 0-64 years Vaccine (1 of 2 - PCV) Never done    Depression Screen  Never done    Varicella vaccine (1 of 2 - 13+ 2-dose series) Never done    HIV screen  Never done    Hepatitis C screen  Never done    Hepatitis B vaccine (1 of 3 - 19+ 3-dose series) Never done    DTaP/Tdap/Td vaccine (1 - Tdap) Never done    Lipids  Never done    Flu vaccine (1) Never done    COVID-19 Vaccine (1 - 2023-24 season) Never done

## 2025-01-22 NOTE — PROGRESS NOTES
Assessment/Plan:     1. Mild intermittent asthma without complication -chronic, stable.  Continue current treatment plan.   2. Class 2 obesity due to excess calories without serious comorbidity with body mass index (BMI) of 39.0 to 39.9 in adult-chronic, unstable. Patient advised to include more fruits and vegetables in diet, avoiding concentrated sweets and processed foods and to exercise at least 30 minutes per day 5 times a week.    3. Tobacco dependence due to cigarettes-chronic, unstable.  Counseled patient extensively on the dangers of smoking and benefits of cessation.  Although patient has cut down significantly, he is not ready to quit yet.  4. Folliculitis-chronic, recurrent, unstable.  Will refer patient to dermatology as requested by patient.  -     MICHELLE - Carrie. MD Mir, Dermatology, [place] ([facility])  5. Epidermoid cyst-chronic, stable.  Discussed treatment options including drainage/removal and advised him to discuss options with dermatology.  6. Bunion-chronic, stable but patient would like to see podiatry so we will place referral.  -     MICHELLE - Grupo Cruz MD, Podiatry, [place] ([address])  7. Witnessed episode of apnea-chronic, unstable.  Patient high risk for MEG so will refer him for sleep study.  -     [facility] - Kenia Del Rosario MD, Sleep Medicine, [place] ([address])  8. Annual physical exam Preventive health maintenance as well as care gaps reviewed with patient and addressed.  Appropriate labs and screenings ordered.  Appropriate vaccines discussed and recommended for patient. Patient also advised to include more fruits and vegetables in diet, avoiding concentrated sweets and processed foods and to exercise at least 30 minutes per day 5 times a week.    -     Lipid Panel; Future  -     Comprehensive Metabolic Panel; Future  -     CBC; Future  -     Hemoglobin A1C; Future  -     Hepatitis C Antibody; Future  -     HIV 1/2 Ag/Ab, 4TH Generation,W Rflx Confirm; Future  9. Need for hepatitis

## 2025-01-23 LAB
ALBUMIN SERPL-MCNC: 4.1 G/DL (ref 3.5–5)
ALBUMIN/GLOB SERPL: 1.2 (ref 1.1–2.2)
ALP SERPL-CCNC: 59 U/L (ref 45–117)
ALT SERPL-CCNC: 31 U/L (ref 12–78)
ANION GAP SERPL CALC-SCNC: 6 MMOL/L (ref 2–12)
AST SERPL-CCNC: 22 U/L (ref 15–37)
BILIRUB SERPL-MCNC: 0.6 MG/DL (ref 0.2–1)
BUN SERPL-MCNC: 15 MG/DL (ref 6–20)
BUN/CREAT SERPL: 15 (ref 12–20)
CALCIUM SERPL-MCNC: 9.2 MG/DL (ref 8.5–10.1)
CHLORIDE SERPL-SCNC: 102 MMOL/L (ref 97–108)
CHOLEST SERPL-MCNC: 164 MG/DL
CO2 SERPL-SCNC: 27 MMOL/L (ref 21–32)
CREAT SERPL-MCNC: 0.98 MG/DL (ref 0.7–1.3)
ERYTHROCYTE [DISTWIDTH] IN BLOOD BY AUTOMATED COUNT: 13.6 % (ref 11.5–14.5)
EST. AVERAGE GLUCOSE BLD GHB EST-MCNC: 114 MG/DL
GLOBULIN SER CALC-MCNC: 3.5 G/DL (ref 2–4)
GLUCOSE SERPL-MCNC: 96 MG/DL (ref 65–100)
HBA1C MFR BLD: 5.6 % (ref 4–5.6)
HCT VFR BLD AUTO: 41.7 % (ref 36.6–50.3)
HCV AB SER IA-ACNC: 0.12 INDEX
HCV AB SERPL QL IA: NONREACTIVE
HDLC SERPL-MCNC: 59 MG/DL
HDLC SERPL: 2.8 (ref 0–5)
HGB BLD-MCNC: 13.4 G/DL (ref 12.1–17)
HIV 1+2 AB+HIV1 P24 AG SERPL QL IA: NONREACTIVE
HIV 1/2 RESULT COMMENT: NORMAL
LDLC SERPL CALC-MCNC: 92 MG/DL (ref 0–100)
MCH RBC QN AUTO: 26.6 PG (ref 26–34)
MCHC RBC AUTO-ENTMCNC: 32.1 G/DL (ref 30–36.5)
MCV RBC AUTO: 82.9 FL (ref 80–99)
NRBC # BLD: 0 K/UL (ref 0–0.01)
NRBC BLD-RTO: 0 PER 100 WBC
PLATELET # BLD AUTO: 217 K/UL (ref 150–400)
PMV BLD AUTO: 11.9 FL (ref 8.9–12.9)
POTASSIUM SERPL-SCNC: 4.2 MMOL/L (ref 3.5–5.1)
PROT SERPL-MCNC: 7.6 G/DL (ref 6.4–8.2)
RBC # BLD AUTO: 5.03 M/UL (ref 4.1–5.7)
SODIUM SERPL-SCNC: 135 MMOL/L (ref 136–145)
TRIGL SERPL-MCNC: 65 MG/DL
VLDLC SERPL CALC-MCNC: 13 MG/DL
WBC # BLD AUTO: 5.8 K/UL (ref 4.1–11.1)

## 2025-05-31 NOTE — ED NOTES
Pt reports right shoulder pain since Friday, denies any injury      Emergency Department Nursing Plan of Care       The Nursing Plan of Care is developed from the Nursing assessment and Emergency Department Attending provider initial evaluation. The plan of care may be reviewed in the ED Provider note.     The Plan of Care was developed with the following considerations:   Patient / Family readiness to learn indicated by:verbalized understanding  Persons(s) to be included in education: patient  Barriers to Learning/Limitations:No    Signed     Sylvester Zuluaga RN    9/18/2017   2:22 PM No